# Patient Record
Sex: FEMALE | Race: WHITE | Employment: UNEMPLOYED | URBAN - METROPOLITAN AREA
[De-identification: names, ages, dates, MRNs, and addresses within clinical notes are randomized per-mention and may not be internally consistent; named-entity substitution may affect disease eponyms.]

---

## 2018-01-02 ENCOUNTER — OFFICE VISIT (OUTPATIENT)
Dept: FAMILY MEDICINE CLINIC | Facility: CLINIC | Age: 63
End: 2018-01-02

## 2018-01-02 ENCOUNTER — TELEPHONE (OUTPATIENT)
Dept: FAMILY MEDICINE CLINIC | Facility: CLINIC | Age: 63
End: 2018-01-02

## 2018-01-02 VITALS
TEMPERATURE: 99 F | SYSTOLIC BLOOD PRESSURE: 126 MMHG | WEIGHT: 179 LBS | HEIGHT: 65 IN | RESPIRATION RATE: 16 BRPM | BODY MASS INDEX: 29.82 KG/M2 | HEART RATE: 56 BPM | DIASTOLIC BLOOD PRESSURE: 80 MMHG

## 2018-01-02 DIAGNOSIS — Z91.010 H/O PEANUT ALLERGY: ICD-10-CM

## 2018-01-02 DIAGNOSIS — M79.10 MYALGIA: ICD-10-CM

## 2018-01-02 DIAGNOSIS — R53.83 TIRED: ICD-10-CM

## 2018-01-02 DIAGNOSIS — I10 ESSENTIAL HYPERTENSION: Primary | ICD-10-CM

## 2018-01-02 DIAGNOSIS — Z00.00 LABORATORY EXAMINATION ORDERED AS PART OF A COMPLETE PHYSICAL EXAMINATION: ICD-10-CM

## 2018-01-02 DIAGNOSIS — E03.9 HYPOTHYROIDISM, UNSPECIFIED TYPE: ICD-10-CM

## 2018-01-02 PROCEDURE — 99204 OFFICE O/P NEW MOD 45 MIN: CPT | Performed by: FAMILY MEDICINE

## 2018-01-02 RX ORDER — AMOXICILLIN 500 MG
CAPSULE ORAL
COMMUNITY

## 2018-01-02 RX ORDER — ATENOLOL 50 MG/1
50 TABLET ORAL DAILY
Qty: 30 TABLET | Refills: 0 | Status: SHIPPED | OUTPATIENT
Start: 2018-01-02 | End: 2018-01-29

## 2018-01-02 RX ORDER — LISINOPRIL 5 MG/1
5 TABLET ORAL DAILY
Qty: 30 TABLET | Refills: 1 | Status: SHIPPED | OUTPATIENT
Start: 2018-01-02 | End: 2018-01-05

## 2018-01-02 RX ORDER — ASCORBIC ACID 1000 MG
TABLET ORAL
COMMUNITY
End: 2018-02-14

## 2018-01-02 RX ORDER — ATENOLOL 100 MG/1
100 TABLET ORAL DAILY
COMMUNITY
End: 2018-01-17

## 2018-01-02 RX ORDER — LEVOTHYROXINE SODIUM 0.12 MG/1
125 TABLET ORAL
Qty: 30 TABLET | Refills: 0 | Status: SHIPPED | OUTPATIENT
Start: 2018-01-02 | End: 2018-01-29

## 2018-01-02 RX ORDER — LEVOTHYROXINE SODIUM 0.12 MG/1
125 TABLET ORAL
COMMUNITY
End: 2018-01-02

## 2018-01-02 RX ORDER — ACETAMINOPHEN 160 MG
2000 TABLET,DISINTEGRATING ORAL DAILY
COMMUNITY

## 2018-01-02 RX ORDER — EPINEPHRINE 0.3 MG/.3ML
0.3 INJECTION SUBCUTANEOUS ONCE
Qty: 1 EACH | Refills: 0 | Status: SHIPPED | OUTPATIENT
Start: 2018-01-02 | End: 2018-01-02

## 2018-01-02 NOTE — TELEPHONE ENCOUNTER
See attached Pharmacist message. On allergy list: Irbesartan with comment: when taken off a lot of joint and muscle stiffness/aches went away. Please advise.

## 2018-01-02 NOTE — TELEPHONE ENCOUNTER
Calling regarding allergie to Angiotensin receptor blockers. And we RX'd Lisonopril  And they are questioning if this should be given to this patient. Pls call back to advise.

## 2018-01-02 NOTE — PROGRESS NOTES
HPI:    Patient ID: Tana Castro is a 63year old female. Pt also has controlled hypothyroidism. Pt is taking levothyroxine as treatment and is is getting moderate relief from it. No associated symptoms at this time except fatigue.     Pt complains about [Etodolac]       Pain  Morphine                Nausea only  Orphenadrine            Tightness in Throat  Percocet [Oxycodone*    Nausea only  Valium [Diazepam]       Rash, Swelling  Vioxx [Rofecoxib]       Itching, Shortness of Breath  Avapro [Irbesartan] REFLEX TO FREE T4; Future  - VITAMIN D, 25-HYDROXY; Future    4. Myalgia    - MAGNESIUM; Future    5. H/O peanut allergy  - EPINEPHrine (EPIPEN 2-NADIA) 0.3 MG/0.3ML Injection Solution Auto-injector; Inject 0.3 mL (1 each total) as directed one time.   Mehran Zarate

## 2018-01-03 NOTE — TELEPHONE ENCOUNTER
Per YP: ok for RX for Lisinopril. Informed [facility] pharmacist. Zoe Bryan approval for Epi in a 2 pack. Task completed.

## 2018-01-05 ENCOUNTER — TELEPHONE (OUTPATIENT)
Dept: FAMILY MEDICINE CLINIC | Facility: CLINIC | Age: 63
End: 2018-01-05

## 2018-01-05 RX ORDER — AMLODIPINE BESYLATE 5 MG/1
5 TABLET ORAL DAILY
Qty: 30 TABLET | Refills: 0 | Status: SHIPPED | OUTPATIENT
Start: 2018-01-05 | End: 2018-01-08

## 2018-01-05 NOTE — TELEPHONE ENCOUNTER
ALLERGY TO CALCIUM CHANNEL BLOCKERS ON FILE. WE RX'D AMLODAPINE FOR PATIENT. DO WE WANT THEM TO DISPENSE THE MEDICATION? PLS CALL BACK TO ADVISE.

## 2018-01-05 NOTE — TELEPHONE ENCOUNTER
Per pharmacy patient states she gets a rash and chest tightness when taking Lisinopril.  Please advise

## 2018-01-06 NOTE — TELEPHONE ENCOUNTER
Pharmacist states patient should not take Amlodipine   She has allergy to calcium channel blockers. Patient states Lisinopril  Has caused chest tightness and rash when tried in the past.  Please advise    Thanks.

## 2018-01-08 RX ORDER — HYDROCHLOROTHIAZIDE 12.5 MG/1
12.5 CAPSULE, GELATIN COATED ORAL DAILY
Qty: 30 CAPSULE | Refills: 1 | Status: SHIPPED | OUTPATIENT
Start: 2018-01-08 | End: 2018-02-06

## 2018-01-09 ENCOUNTER — TELEPHONE (OUTPATIENT)
Dept: FAMILY MEDICINE CLINIC | Facility: CLINIC | Age: 63
End: 2018-01-09

## 2018-01-09 NOTE — TELEPHONE ENCOUNTER
[facility] calling, regarding the prescription for patients Amlopidine. They state that there is an interaction.       Please call

## 2018-01-17 ENCOUNTER — OFFICE VISIT (OUTPATIENT)
Dept: FAMILY MEDICINE CLINIC | Facility: CLINIC | Age: 63
End: 2018-01-17

## 2018-01-17 VITALS
SYSTOLIC BLOOD PRESSURE: 124 MMHG | DIASTOLIC BLOOD PRESSURE: 78 MMHG | WEIGHT: 174 LBS | HEIGHT: 65 IN | OXYGEN SATURATION: 98 % | BODY MASS INDEX: 28.99 KG/M2 | TEMPERATURE: 99 F | HEART RATE: 65 BPM | RESPIRATION RATE: 20 BRPM

## 2018-01-17 DIAGNOSIS — I10 ESSENTIAL HYPERTENSION: Primary | ICD-10-CM

## 2018-01-17 PROCEDURE — 99213 OFFICE O/P EST LOW 20 MIN: CPT | Performed by: FAMILY MEDICINE

## 2018-01-17 NOTE — PROGRESS NOTES
HPI:    Patient ID: Suyapa Nunez is a 63year old female. Hypertension   This is a chronic problem. The current episode started more than 1 year ago. The problem has been gradually improving since onset. The problem is controlled.  Past treatments include exhibits no discharge. Left eye exhibits no discharge. Cardiovascular: Normal rate, regular rhythm and normal heart sounds. Pulmonary/Chest: Effort normal and breath sounds normal. No respiratory distress. She has no wheezes. She has no rales.    Vital

## 2018-01-29 DIAGNOSIS — I10 ESSENTIAL HYPERTENSION: ICD-10-CM

## 2018-01-29 DIAGNOSIS — E03.9 HYPOTHYROIDISM, UNSPECIFIED TYPE: ICD-10-CM

## 2018-01-30 RX ORDER — ATENOLOL 50 MG/1
50 TABLET ORAL DAILY
Qty: 30 TABLET | Refills: 0 | Status: SHIPPED
Start: 2018-01-30 | End: 2018-02-28

## 2018-01-30 RX ORDER — LEVOTHYROXINE SODIUM 0.12 MG/1
125 TABLET ORAL
Qty: 30 TABLET | Refills: 0 | Status: SHIPPED
Start: 2018-01-30 | End: 2018-02-28

## 2018-01-30 NOTE — TELEPHONE ENCOUNTER
From: Christina Taylor  Sent: 1/29/2018 8:00 PM CST  Subject: Medication Renewal Request    Christina Taylor would like a refill of the following medications:     atenolol 50 MG Oral Tab Manish Son, DO]   Patient Comment: insurance kicks in 2/1( can call and get

## 2018-02-03 ENCOUNTER — LAB ENCOUNTER (OUTPATIENT)
Dept: LAB | Age: 63
End: 2018-02-03
Attending: FAMILY MEDICINE
Payer: MEDICAID

## 2018-02-03 DIAGNOSIS — Z00.00 LABORATORY EXAMINATION ORDERED AS PART OF A COMPLETE PHYSICAL EXAMINATION: ICD-10-CM

## 2018-02-03 DIAGNOSIS — M79.10 MYALGIA: ICD-10-CM

## 2018-02-03 DIAGNOSIS — R73.01 ELEVATED FASTING GLUCOSE: ICD-10-CM

## 2018-02-03 LAB
25-HYDROXYVITAMIN D (TOTAL): 52.1 NG/ML (ref 30–100)
ALBUMIN SERPL-MCNC: 3.7 G/DL (ref 3.5–4.8)
ALP LIVER SERPL-CCNC: 65 U/L (ref 46–118)
ALT SERPL-CCNC: 29 U/L (ref 14–54)
AST SERPL-CCNC: 18 U/L (ref 15–41)
BASOPHILS # BLD AUTO: 0.04 X10(3) UL (ref 0–0.1)
BASOPHILS NFR BLD AUTO: 0.6 %
BILIRUB SERPL-MCNC: 0.5 MG/DL (ref 0.1–2)
BUN BLD-MCNC: 19 MG/DL (ref 8–20)
CALCIUM BLD-MCNC: 9.1 MG/DL (ref 8.3–10.3)
CHLORIDE: 103 MMOL/L (ref 101–111)
CHOLEST SMN-MCNC: 217 MG/DL (ref ?–200)
CO2: 32 MMOL/L (ref 22–32)
CREAT BLD-MCNC: 0.87 MG/DL (ref 0.55–1.02)
EOSINOPHIL # BLD AUTO: 0.16 X10(3) UL (ref 0–0.3)
EOSINOPHIL NFR BLD AUTO: 2.4 %
ERYTHROCYTE [DISTWIDTH] IN BLOOD BY AUTOMATED COUNT: 13.2 % (ref 11.5–16)
GLUCOSE BLD-MCNC: 105 MG/DL (ref 70–99)
HAV IGM SER QL: 2.3 MG/DL (ref 1.7–3)
HCT VFR BLD AUTO: 40.1 % (ref 34–50)
HDLC SERPL-MCNC: 84 MG/DL (ref 45–?)
HDLC SERPL: 2.58 {RATIO} (ref ?–4.44)
HGB BLD-MCNC: 13.1 G/DL (ref 12–16)
IMMATURE GRANULOCYTE COUNT: 0.01 X10(3) UL (ref 0–1)
IMMATURE GRANULOCYTE RATIO %: 0.1 %
LDLC SERPL CALC-MCNC: 122 MG/DL (ref ?–130)
LYMPHOCYTES # BLD AUTO: 1.81 X10(3) UL (ref 0.9–4)
LYMPHOCYTES NFR BLD AUTO: 26.8 %
M PROTEIN MFR SERPL ELPH: 7.8 G/DL (ref 6.1–8.3)
MCH RBC QN AUTO: 29.8 PG (ref 27–33.2)
MCHC RBC AUTO-ENTMCNC: 32.7 G/DL (ref 31–37)
MCV RBC AUTO: 91.1 FL (ref 81–100)
MONOCYTES # BLD AUTO: 0.43 X10(3) UL (ref 0.1–0.6)
MONOCYTES NFR BLD AUTO: 6.4 %
NEUTROPHIL ABS PRELIM: 4.31 X10 (3) UL (ref 1.3–6.7)
NEUTROPHILS # BLD AUTO: 4.31 X10(3) UL (ref 1.3–6.7)
NEUTROPHILS NFR BLD AUTO: 63.7 %
NONHDLC SERPL-MCNC: 133 MG/DL (ref ?–130)
PLATELET # BLD AUTO: 242 10(3)UL (ref 150–450)
POTASSIUM SERPL-SCNC: 3.7 MMOL/L (ref 3.6–5.1)
RBC # BLD AUTO: 4.4 X10(6)UL (ref 3.8–5.1)
RED CELL DISTRIBUTION WIDTH-SD: 44.1 FL (ref 35.1–46.3)
SODIUM SERPL-SCNC: 140 MMOL/L (ref 136–144)
TRIGL SERPL-MCNC: 56 MG/DL (ref ?–150)
TSI SER-ACNC: 0.51 MIU/ML (ref 0.35–5.5)
VLDLC SERPL CALC-MCNC: 11 MG/DL (ref 5–40)
WBC # BLD AUTO: 6.8 X10(3) UL (ref 4–13)

## 2018-02-03 PROCEDURE — 83735 ASSAY OF MAGNESIUM: CPT | Performed by: FAMILY MEDICINE

## 2018-02-03 PROCEDURE — 36415 COLL VENOUS BLD VENIPUNCTURE: CPT | Performed by: FAMILY MEDICINE

## 2018-02-03 PROCEDURE — 80050 GENERAL HEALTH PANEL: CPT | Performed by: FAMILY MEDICINE

## 2018-02-03 PROCEDURE — 80061 LIPID PANEL: CPT | Performed by: FAMILY MEDICINE

## 2018-02-03 PROCEDURE — 82306 VITAMIN D 25 HYDROXY: CPT | Performed by: FAMILY MEDICINE

## 2018-02-03 PROCEDURE — 83036 HEMOGLOBIN GLYCOSYLATED A1C: CPT | Performed by: FAMILY MEDICINE

## 2018-02-05 LAB
EST. AVERAGE GLUCOSE BLD GHB EST-MCNC: 128 MG/DL (ref 68–126)
HBA1C MFR BLD HPLC: 6.1 % (ref ?–5.7)

## 2018-02-06 RX ORDER — HYDROCHLOROTHIAZIDE 12.5 MG/1
12.5 CAPSULE, GELATIN COATED ORAL DAILY
Qty: 30 CAPSULE | Refills: 1 | Status: SHIPPED
Start: 2018-02-06 | End: 2018-02-28

## 2018-02-06 NOTE — TELEPHONE ENCOUNTER
From: Yvette Zuniga  Sent: 2/6/2018 1:54 PM CST  Subject: Medication Renewal Request    Yvette Zuniga would like a refill of the following medications:     hydrochlorothiazide 12.5 MG Oral Cap Erich Lugo, DO]   Patient Comment: i need a refill on this RX pl

## 2018-02-14 ENCOUNTER — OFFICE VISIT (OUTPATIENT)
Dept: FAMILY MEDICINE CLINIC | Facility: CLINIC | Age: 63
End: 2018-02-14

## 2018-02-14 VITALS
HEART RATE: 60 BPM | DIASTOLIC BLOOD PRESSURE: 78 MMHG | OXYGEN SATURATION: 98 % | RESPIRATION RATE: 20 BRPM | WEIGHT: 175 LBS | HEIGHT: 65 IN | SYSTOLIC BLOOD PRESSURE: 122 MMHG | BODY MASS INDEX: 29.16 KG/M2

## 2018-02-14 DIAGNOSIS — Z00.00 ANNUAL PHYSICAL EXAM: Primary | ICD-10-CM

## 2018-02-14 DIAGNOSIS — Z12.11 COLON CANCER SCREENING: ICD-10-CM

## 2018-02-14 DIAGNOSIS — S16.1XXA STRAIN OF NECK MUSCLE, INITIAL ENCOUNTER: ICD-10-CM

## 2018-02-14 DIAGNOSIS — M81.0 OSTEOPOROSIS WITHOUT CURRENT PATHOLOGICAL FRACTURE, UNSPECIFIED OSTEOPOROSIS TYPE: ICD-10-CM

## 2018-02-14 PROCEDURE — 99396 PREV VISIT EST AGE 40-64: CPT | Performed by: FAMILY MEDICINE

## 2018-02-14 NOTE — H&P
Zulma Xiong is a 63year old female who presents for a well woman physical exam:       .Neck Pain    This is a chronic problem. The current episode started more than 1 year ago. The problem occurs daily. The problem has been unchanged.  The pain is associat Vitamin D supplementation and Weight Bearing Exercises. Patient is currently taking calcium and Vitamin D supplementation.         REVIEW OF SYSTEMS:   GENERAL: feels well otherwise  SKIN: denies any unusual skin lesions  EYES:denies blurred vision or do neck muscle, initial encounter  - OP REFERRAL TO [facility]    Meds & Refills for this Visit:    No prescriptions requested or ordered in this encounter    Korey Ordaz was given an opportunity to ask questions and verbalized understanding of

## 2018-02-21 ENCOUNTER — TELEPHONE (OUTPATIENT)
Dept: FAMILY MEDICINE CLINIC | Facility: CLINIC | Age: 63
End: 2018-02-21

## 2018-02-21 DIAGNOSIS — Z12.31 ENCOUNTER FOR SCREENING MAMMOGRAM FOR BREAST CANCER: Primary | ICD-10-CM

## 2018-02-21 NOTE — TELEPHONE ENCOUNTER
Reordered test with correct diagnosis, unable to cancel previous order because it is already scheduled.

## 2018-02-21 NOTE — TELEPHONE ENCOUNTER
Marge Wiseman from the [facility] called to request to please change the dx on the order doc Alexandre Joshua wrote Colon screening cancer and it should say something like breast screening. Please call and advise. Pt has an appt next week.

## 2018-02-28 ENCOUNTER — HOSPITAL ENCOUNTER (OUTPATIENT)
Dept: MAMMOGRAPHY | Age: 63
Discharge: HOME | End: 2018-02-28
Attending: FAMILY MEDICINE
Payer: MEDICAID

## 2018-02-28 ENCOUNTER — HOSPITAL ENCOUNTER (OUTPATIENT)
Dept: BONE DENSITY | Age: 63
Discharge: HOME | End: 2018-02-28
Attending: FAMILY MEDICINE
Payer: MEDICAID

## 2018-02-28 DIAGNOSIS — Z12.31 ENCOUNTER FOR SCREENING MAMMOGRAM FOR BREAST CANCER: ICD-10-CM

## 2018-02-28 DIAGNOSIS — M81.0 OSTEOPOROSIS WITHOUT CURRENT PATHOLOGICAL FRACTURE, UNSPECIFIED OSTEOPOROSIS TYPE: ICD-10-CM

## 2018-02-28 DIAGNOSIS — E03.9 HYPOTHYROIDISM, UNSPECIFIED TYPE: ICD-10-CM

## 2018-02-28 DIAGNOSIS — I10 ESSENTIAL HYPERTENSION: ICD-10-CM

## 2018-02-28 PROCEDURE — 77063 BREAST TOMOSYNTHESIS BI: CPT | Performed by: FAMILY MEDICINE

## 2018-02-28 PROCEDURE — 77067 SCR MAMMO BI INCL CAD: CPT | Performed by: FAMILY MEDICINE

## 2018-02-28 PROCEDURE — 77080 DXA BONE DENSITY AXIAL: CPT | Performed by: FAMILY MEDICINE

## 2018-02-28 RX ORDER — LEVOTHYROXINE SODIUM 0.12 MG/1
125 TABLET ORAL
Qty: 30 TABLET | Refills: 0 | Status: SHIPPED
Start: 2018-02-28 | End: 2018-04-03

## 2018-02-28 RX ORDER — HYDROCHLOROTHIAZIDE 12.5 MG/1
12.5 CAPSULE, GELATIN COATED ORAL DAILY
Qty: 30 CAPSULE | Refills: 1 | Status: SHIPPED
Start: 2018-02-28 | End: 2018-04-26

## 2018-02-28 RX ORDER — ATENOLOL 50 MG/1
50 TABLET ORAL DAILY
Qty: 30 TABLET | Refills: 0 | Status: SHIPPED
Start: 2018-02-28 | End: 2018-04-03

## 2018-02-28 NOTE — TELEPHONE ENCOUNTER
From: Ariel Brandon  Sent: 2/28/2018 10:10 AM CST  Subject: Medication Renewal Request    Paz Bradshaw.  Tianna Hope would like a refill of the following medications:     atenolol 50 MG Oral Tab FAHAD Aguilera     Levothyroxine Sodium 125 MCG Oral Tab [Michelle FARIAS

## 2018-03-02 ENCOUNTER — OFFICE VISIT (OUTPATIENT)
Dept: PHYSICAL THERAPY | Age: 63
End: 2018-03-02
Attending: FAMILY MEDICINE
Payer: MEDICAID

## 2018-03-02 DIAGNOSIS — S16.1XXA STRAIN OF NECK MUSCLE, INITIAL ENCOUNTER: ICD-10-CM

## 2018-03-02 PROCEDURE — 97162 PT EVAL MOD COMPLEX 30 MIN: CPT

## 2018-03-02 PROCEDURE — 97110 THERAPEUTIC EXERCISES: CPT

## 2018-03-05 NOTE — PROGRESS NOTES
SPINE EVALUATION:   Referring Physician: Dr. Bernie Hilton  Diagnosis:  Neck pain  Date of Service: 3/5/2018     PATIENT Corinne Hallmark is a 63year old y/o female who presents to therapy today with complaints of  R sided neck pain today.  Pt has his could toe and heel walk      Flexibility:   UE/Scapular LE   Upper Trap: R  mod; L mod  Levator Scap: R min; L min  Pec Major: R mod; L mod Hip Flexor: R mod, L mod  Hamstrings: R mod; L mod  Piriformis: R mod; L mod  Quads: R  min L min  Gastroc-soleus: R understanding of proper posture and body mechanics to decrease pain and improve spinal safety ( 10  visits)  Pt will improve lumbar spine AROM flexion to > 70 deg to allow increase ease with bending forward to don shoes  (10  visits)  Pt will report improv

## 2018-03-07 ENCOUNTER — OFFICE VISIT (OUTPATIENT)
Dept: PHYSICAL THERAPY | Age: 63
End: 2018-03-07
Attending: FAMILY MEDICINE
Payer: MEDICAID

## 2018-03-07 PROCEDURE — 97110 THERAPEUTIC EXERCISES: CPT

## 2018-03-07 PROCEDURE — 97140 MANUAL THERAPY 1/> REGIONS: CPT

## 2018-03-07 NOTE — PROGRESS NOTES
Dx:  R piriformis and B cervical strain        Authorized # of Visits:  10         Next MD visit: none scheduled  Fall Risk: standard         Precautions: n/a             Subjective: very sore after  My eval.  I have to bend down low into sinks at work, I of proper posture and body mechanics to decrease pain and improve spinal safety ( 10  visits)  · Pt will improve lumbar spine AROM flexion to > 70 deg to allow increase ease with bending forward to don shoes  (10  visits)  · Pt will report improved symptom

## 2018-03-09 ENCOUNTER — OFFICE VISIT (OUTPATIENT)
Dept: PHYSICAL THERAPY | Age: 63
End: 2018-03-09
Attending: FAMILY MEDICINE
Payer: MEDICAID

## 2018-03-09 PROCEDURE — 97140 MANUAL THERAPY 1/> REGIONS: CPT

## 2018-03-09 PROCEDURE — 97110 THERAPEUTIC EXERCISES: CPT

## 2018-03-09 NOTE — PROGRESS NOTES
Dx:  R piriformis and B cervical strain        Authorized # of Visits:  10         Next MD visit: none scheduled  Fall Risk: standard         Precautions: n/a             Subjective:  Slept better last night.  Today no pain in my neck, or low back just sore will demonstrate good understanding of proper posture and body mechanics to decrease pain and improve spinal safety ( 10  visits)  · Pt will improve lumbar spine AROM flexion to > 70 deg to allow increase ease with bending forward to don shoes  (10  visits

## 2018-03-12 ENCOUNTER — OFFICE VISIT (OUTPATIENT)
Dept: FAMILY MEDICINE CLINIC | Facility: CLINIC | Age: 63
End: 2018-03-12

## 2018-03-12 ENCOUNTER — HOSPITAL ENCOUNTER (OUTPATIENT)
Dept: GENERAL RADIOLOGY | Age: 63
Discharge: HOME | End: 2018-03-12
Attending: FAMILY MEDICINE
Payer: MEDICAID

## 2018-03-12 VITALS
SYSTOLIC BLOOD PRESSURE: 118 MMHG | DIASTOLIC BLOOD PRESSURE: 76 MMHG | WEIGHT: 174 LBS | OXYGEN SATURATION: 97 % | HEIGHT: 65 IN | BODY MASS INDEX: 28.99 KG/M2 | HEART RATE: 76 BPM | RESPIRATION RATE: 18 BRPM | TEMPERATURE: 98 F

## 2018-03-12 DIAGNOSIS — R05.9 COUGH: Primary | ICD-10-CM

## 2018-03-12 DIAGNOSIS — R05.9 COUGH: ICD-10-CM

## 2018-03-12 PROCEDURE — 71046 X-RAY EXAM CHEST 2 VIEWS: CPT | Performed by: FAMILY MEDICINE

## 2018-03-12 PROCEDURE — 99213 OFFICE O/P EST LOW 20 MIN: CPT | Performed by: FAMILY MEDICINE

## 2018-03-12 NOTE — PROGRESS NOTES
HPI:    Patient ID: Ashley Villa is a 63year old female. Cough   This is a new problem. Episode onset: Wednesday. The problem has been gradually worsening. The problem occurs every few minutes.  The cough is productive of sputum and productive of br taken off a lot of joint & muscle stiffness /             aches went away  Chlorzoxazone           Respiratory failure  Plaquenil [Hydroxyc*    Pain  Lisinopril              Itching, Rash, Tightness in Chest   PHYSICAL EXAM:   Physical Exam   Constitutiona

## 2018-03-14 ENCOUNTER — OFFICE VISIT (OUTPATIENT)
Dept: PHYSICAL THERAPY | Age: 63
End: 2018-03-14
Attending: FAMILY MEDICINE
Payer: MEDICAID

## 2018-03-14 PROCEDURE — 97140 MANUAL THERAPY 1/> REGIONS: CPT

## 2018-03-14 PROCEDURE — 97110 THERAPEUTIC EXERCISES: CPT

## 2018-03-14 NOTE — PROGRESS NOTES
Dx:  R piriformis and B cervical strain        Authorized # of Visits:  10         Next MD visit: none scheduled  Fall Risk: standard         Precautions: n/a             Subjective:   I have been fighting bronchitis. I am feeling better today.  No pain in demonstrate good understanding of proper posture and body mechanics to decrease pain and improve spinal safety ( 10  visits)  · Pt will improve lumbar spine AROM flexion to > 70 deg to allow increase ease with bending forward to don shoes  (10  visits)  · MHP 10 mins c spine        Skilled Services: skilled manual PT and skilled exercises issued to pt today     Charges: EX 1 MT 2        Total Timed Treatment: 45 min  Total Treatment Time: 45 min

## 2018-03-16 ENCOUNTER — HOSPITAL ENCOUNTER (OUTPATIENT)
Dept: MAMMOGRAPHY | Age: 63
Discharge: HOME | End: 2018-03-16
Attending: FAMILY MEDICINE
Payer: MEDICAID

## 2018-03-16 ENCOUNTER — HOSPITAL ENCOUNTER (OUTPATIENT)
Dept: ULTRASOUND IMAGING | Age: 63
Discharge: HOME | End: 2018-03-16
Attending: FAMILY MEDICINE
Payer: MEDICAID

## 2018-03-16 DIAGNOSIS — R92.2 INCONCLUSIVE MAMMOGRAM: ICD-10-CM

## 2018-03-16 PROCEDURE — 76642 ULTRASOUND BREAST LIMITED: CPT | Performed by: FAMILY MEDICINE

## 2018-03-16 PROCEDURE — 77066 DX MAMMO INCL CAD BI: CPT | Performed by: FAMILY MEDICINE

## 2018-03-16 PROCEDURE — 77062 BREAST TOMOSYNTHESIS BI: CPT | Performed by: FAMILY MEDICINE

## 2018-03-21 ENCOUNTER — OFFICE VISIT (OUTPATIENT)
Dept: PHYSICAL THERAPY | Age: 63
End: 2018-03-21
Attending: FAMILY MEDICINE
Payer: MEDICAID

## 2018-03-21 DIAGNOSIS — S16.1XXA STRAIN OF NECK MUSCLE, INITIAL ENCOUNTER: ICD-10-CM

## 2018-03-21 PROCEDURE — 97110 THERAPEUTIC EXERCISES: CPT

## 2018-03-21 PROCEDURE — 97140 MANUAL THERAPY 1/> REGIONS: CPT

## 2018-04-03 DIAGNOSIS — I10 ESSENTIAL HYPERTENSION: ICD-10-CM

## 2018-04-03 DIAGNOSIS — E03.9 HYPOTHYROIDISM, UNSPECIFIED TYPE: ICD-10-CM

## 2018-04-03 RX ORDER — ATENOLOL 50 MG/1
50 TABLET ORAL DAILY
Qty: 30 TABLET | Refills: 0 | Status: SHIPPED
Start: 2018-04-03 | End: 2018-04-26

## 2018-04-03 RX ORDER — LEVOTHYROXINE SODIUM 0.12 MG/1
125 TABLET ORAL
Qty: 30 TABLET | Refills: 0 | Status: SHIPPED
Start: 2018-04-03 | End: 2018-04-26

## 2018-04-03 NOTE — TELEPHONE ENCOUNTER
From: Monet Thompson  Sent: 4/3/2018 7:18 AM CDT  Subject: Medication Renewal Request    Mary Jo Palumbo.  Tereza Wyatt would like a refill of the following medications:     atenolol 50 MG Oral Tab FAHAD Dorman     Levothyroxine Sodium 125 MCG Oral Tab Harvey Sat

## 2018-04-26 DIAGNOSIS — E03.9 HYPOTHYROIDISM, UNSPECIFIED TYPE: ICD-10-CM

## 2018-04-26 DIAGNOSIS — I10 ESSENTIAL HYPERTENSION: ICD-10-CM

## 2018-04-26 RX ORDER — LEVOTHYROXINE SODIUM 0.12 MG/1
125 TABLET ORAL
Qty: 90 TABLET | Refills: 0 | Status: SHIPPED | OUTPATIENT
Start: 2018-04-26 | End: 2018-07-10

## 2018-04-26 RX ORDER — ATENOLOL 50 MG/1
50 TABLET ORAL DAILY
Qty: 90 TABLET | Refills: 0 | Status: SHIPPED | OUTPATIENT
Start: 2018-04-26 | End: 2018-07-10

## 2018-04-26 RX ORDER — HYDROCHLOROTHIAZIDE 12.5 MG/1
12.5 CAPSULE, GELATIN COATED ORAL DAILY
Qty: 90 CAPSULE | Refills: 0 | Status: SHIPPED | OUTPATIENT
Start: 2018-04-26 | End: 2018-07-10

## 2018-04-26 NOTE — TELEPHONE ENCOUNTER
From: Olamide Noonan  Sent: 4/26/2018 3:15 PM CDT  Subject: Medication Renewal Request    Miguel Angel Freeman would like a refill of the following medications:     hydrochlorothiazide 12.5 MG Oral Cap Brendan Cruz, DO]   Patient Comment: could I please get 9

## 2018-07-06 ENCOUNTER — PATIENT MESSAGE (OUTPATIENT)
Dept: FAMILY MEDICINE CLINIC | Facility: CLINIC | Age: 63
End: 2018-07-06

## 2018-07-06 DIAGNOSIS — I10 ESSENTIAL HYPERTENSION: ICD-10-CM

## 2018-07-06 DIAGNOSIS — E03.9 HYPOTHYROIDISM, UNSPECIFIED TYPE: ICD-10-CM

## 2018-07-06 NOTE — TELEPHONE ENCOUNTER
From: Valentin Marshall  To: Barber Braxtonos, DO  Sent: 7/6/2018 7:31 AM CDT  Subject: Prescription Question    Good Morning Dr Bertha Colby,   I am currently with family in [address] and will be needed the following RX filled.   ( copied from my chart )     hydroc

## 2018-07-10 RX ORDER — HYDROCHLOROTHIAZIDE 12.5 MG/1
12.5 CAPSULE, GELATIN COATED ORAL DAILY
Qty: 90 CAPSULE | Refills: 0 | Status: SHIPPED | OUTPATIENT
Start: 2018-07-10

## 2018-07-10 RX ORDER — ATENOLOL 50 MG/1
50 TABLET ORAL DAILY
Qty: 90 TABLET | Refills: 0 | Status: SHIPPED | OUTPATIENT
Start: 2018-07-10

## 2018-07-10 RX ORDER — LEVOTHYROXINE SODIUM 0.12 MG/1
125 TABLET ORAL
Qty: 90 TABLET | Refills: 0 | Status: SHIPPED | OUTPATIENT
Start: 2018-07-10

## 2018-07-10 NOTE — TELEPHONE ENCOUNTER
Please refill these meds to [facility] like pt wants. 90 day supply without refill. srikanth you.

## 2024-05-11 NOTE — PROGRESS NOTES
"  Assessment & Plan     (H65.02) Non-recurrent acute serous otitis media of left ear  (primary encounter diagnosis)    Comment: Left-sided otitis media.  Otherwise appears well.  Vital signs are stable.    Plan: cefdinir (OMNICEF) 300 MG capsule          Plan to treat with cefdinir twice a day for 7 days.  Continue other management for congestion.  Follow-up with ENT for persisting symptoms.  Return to rapid clinic or ER for worsening or changing symptoms.  She is comfortable and agreeable with this plan.    Kayla Davison is a 69 year old, presenting for the following health issues:  Otalgia and Sinus Problem (Drainage and sore throat)    HPI     Patient presents today with a 3-day history of sinus pressure and drainage, and ear pain over the last couple of days.  She states it is her left ear.  States it continues to worsen each day.  She has been using her Justina pot with budesonide rinses.  She started her Flonase nasal spray.  She has also taken Tylenol.      Review of Systems  Constitutional, HEENT, cardiovascular, pulmonary, gi and gu systems are negative, except as otherwise noted.        Objective    /86   Pulse 88   Temp 98.1  F (36.7  C) (Temporal)   Resp 16   Ht 1.6 m (5' 3\")   Wt 72.6 kg (160 lb)   LMP 05/11/2004 (Approximate)   SpO2 99%   Breastfeeding No   BMI 28.34 kg/m    Body mass index is 28.34 kg/m .    Physical Exam   GENERAL: alert and no distress  EYES: Eyes grossly normal to inspection, PERRL and conjunctivae and sclerae normal  HENT: ear canals clear, left TM with moderate erythema, bulging, suppurative fluid, right tympanic membrane with slight erythema over the bony landmarks, flat, serous fluid, nose and mouth without ulcers or lesions  NECK: no adenopathy, no asymmetry, masses, or scars  RESP: lungs clear to auscultation - no rales, rhonchi or wheezes  CV: regular rate and rhythm, normal S1 S2  MS: no gross musculoskeletal defects noted, no edema        Signed " Dx:  R piriformis and B cervical strain        Authorized # of Visits:  10         Next MD visit: none scheduled  Fall Risk: standard         Precautions: n/a             Subjective:    0-1/10 in the cervical spine. Still getting over my bronchitis.  Jcarlos Ceron Electronically by: Marely Lara PA-C     visits)  · Pt will improve lumbar spine AROM flexion to > 70 deg to allow increase ease with bending forward to don shoes  (10  visits)  · Pt will report improved symptom centralization and absence of radicular symptoms for 3 consecutive days to improve fu 10 x         MHP cervical spine 10 mins  PROM cervical spine  MHP low back and the cervical spine 10 mins         MHP 10 mins c spine        Skilled Services: skilled manual PT and skilled exercises issued to pt today     Charges: EX 2 MT 1     Total Timed

## 2024-11-27 ENCOUNTER — OFFICE VISIT (OUTPATIENT)
Dept: URBAN - METROPOLITAN AREA CLINIC 41 | Facility: CLINIC | Age: 69
End: 2024-11-27
Payer: MEDICARE

## 2024-11-27 DIAGNOSIS — H04.123 DRY EYE SYNDROME OF BILATERAL LACRIMAL GLANDS: ICD-10-CM

## 2024-11-27 DIAGNOSIS — H53.10 UNSPECIFIED SUBJECTIVE VISUAL DISTURBANCES: Primary | ICD-10-CM

## 2024-11-27 DIAGNOSIS — H25.13 AGE-RELATED NUCLEAR CATARACT, BILATERAL: ICD-10-CM

## 2024-11-27 PROCEDURE — 99204 OFFICE O/P NEW MOD 45 MIN: CPT | Performed by: OPHTHALMOLOGY

## 2024-11-27 PROCEDURE — 92134 CPTRZ OPH DX IMG PST SGM RTA: CPT | Performed by: OPHTHALMOLOGY

## 2024-11-27 ASSESSMENT — INTRAOCULAR PRESSURE
OD: 10
OS: 9

## (undated) NOTE — LETTER
Patient Name: Dunia Swenson  YOB: 1955          MRN number:  [id number]  Date:  3/5/2018  Referring Physician:  Radha Frank          SPINE EVALUATION:    Referring Physician: Dr. Dandre Wilson  Diagnosis:  Neck pain  Date of Service: 3/5/2018     PA Accessory motion:  Limited cervical spine moderate.  With PA glides B   Palpation:  + R piriformis + B upper traps    Strength:   UE/Scapular LE     3+/5 grossly B  Hip flexors 3+/5  Knee ext/flex 5/5  Pt could toe and heel walk      Flexibility:   UE/Scapu progress achieved in PT ( 10 visits)Pt will improve lower abdominal recruitment to perform proper isometric contraction without requiring verbal or tactile cuing to promote advancement of therex  10  visits)  Pt will demonstrate good understanding of prope I certify the need for these services furnished under this plan of treatment and while under my care.     X___________________________________________________ Date____________________    Certification From: 8/6/7660  To:6/3/2018

## (undated) NOTE — LETTER
02/01/18        [address] [facility]      Dear Aristeo Osman,    [facility] records indicate that you have outstanding lab work and or testing that was ordered for you and has not yet been completed:          CBC W Differential W